# Patient Record
Sex: FEMALE | Race: WHITE | NOT HISPANIC OR LATINO | Employment: OTHER | URBAN - NONMETROPOLITAN AREA
[De-identification: names, ages, dates, MRNs, and addresses within clinical notes are randomized per-mention and may not be internally consistent; named-entity substitution may affect disease eponyms.]

---

## 2023-06-19 ENCOUNTER — OFFICE VISIT (OUTPATIENT)
Dept: URGENT CARE | Facility: PHYSICIAN GROUP | Age: 63
End: 2023-06-19
Payer: OTHER GOVERNMENT

## 2023-06-19 VITALS
DIASTOLIC BLOOD PRESSURE: 88 MMHG | WEIGHT: 293 LBS | HEART RATE: 103 BPM | RESPIRATION RATE: 16 BRPM | TEMPERATURE: 97.2 F | SYSTOLIC BLOOD PRESSURE: 140 MMHG | OXYGEN SATURATION: 94 % | BODY MASS INDEX: 50.02 KG/M2 | HEIGHT: 64 IN

## 2023-06-19 DIAGNOSIS — H66.92 ACUTE BACTERIAL OTITIS MEDIA, LEFT: ICD-10-CM

## 2023-06-19 PROCEDURE — 3079F DIAST BP 80-89 MM HG: CPT | Performed by: NURSE PRACTITIONER

## 2023-06-19 PROCEDURE — 99203 OFFICE O/P NEW LOW 30 MIN: CPT | Performed by: NURSE PRACTITIONER

## 2023-06-19 PROCEDURE — 3077F SYST BP >= 140 MM HG: CPT | Performed by: NURSE PRACTITIONER

## 2023-06-19 RX ORDER — DOXYCYCLINE HYCLATE 100 MG
100 TABLET ORAL 2 TIMES DAILY
Qty: 20 TABLET | Refills: 0 | Status: SHIPPED | OUTPATIENT
Start: 2023-06-19 | End: 2023-06-29

## 2023-06-19 ASSESSMENT — ENCOUNTER SYMPTOMS
DIZZINESS: 0
MYALGIAS: 0
FEVER: 0
HEADACHES: 0
SORE THROAT: 0
SINUS PAIN: 0
CHILLS: 0

## 2023-06-20 NOTE — PROGRESS NOTES
"Subjective:   Mariluz Vincent is a 62 y.o. female who presents for Otalgia (L ear pain, was seen 5/2 and 5/12 for same thing, received antibiotics both times,( doxycycline) )    Very pleasant 62-year-old female patient presents to clinic today with ongoing left ear pain and gland swelling and pain under jaw for over a month.  She has been treated twice at [facility] for otitis media of left ear once with Augmentin and the second round of antibiotics was doxycycline.  Patient does state that she did feel some symptom relief for several days with both antibiotics however symptoms did return within a few days.  She has not had any fever, chills, hearing loss, discharge from her ears, nasal congestion or sore throat.  She has been taking over-the-counter acetaminophen to help with the pain.      Review of Systems   Constitutional:  Positive for malaise/fatigue. Negative for chills and fever.   HENT:  Positive for ear pain. Negative for congestion, ear discharge, hearing loss, sinus pain, sore throat and tinnitus.    Musculoskeletal:  Negative for myalgias.   Neurological:  Negative for dizziness and headaches.       Medications, Allergies, and current problem list reviewed today in Epic.     Objective:     BP (!) 140/88   Pulse (!) 103   Temp 36.2 °C (97.2 °F) (Temporal)   Resp 16   Ht 1.626 m (5' 4\")   Wt (!) 136 kg (300 lb)   SpO2 94%     Physical Exam  Vitals reviewed.   Constitutional:       Appearance: Normal appearance.   HENT:      Head: Normocephalic.      Right Ear: Tympanic membrane, ear canal and external ear normal. No middle ear effusion. No mastoid tenderness. Tympanic membrane is not erythematous.      Left Ear: Tympanic membrane, ear canal and external ear normal.  No middle ear effusion. No mastoid tenderness. Tympanic membrane is not erythematous.      Nose: Nose normal. No mucosal edema, congestion or rhinorrhea.      Right Sinus: No maxillary sinus tenderness or frontal sinus " tenderness.      Left Sinus: No maxillary sinus tenderness or frontal sinus tenderness.      Mouth/Throat:      Lips: Pink.      Mouth: Mucous membranes are moist.   Eyes:      Extraocular Movements: Extraocular movements intact.      Conjunctiva/sclera: Conjunctivae normal.      Pupils: Pupils are equal, round, and reactive to light.   Cardiovascular:      Rate and Rhythm: Normal rate.   Pulmonary:      Effort: Pulmonary effort is normal.   Musculoskeletal:         General: Normal range of motion.      Cervical back: Normal range of motion and neck supple.   Lymphadenopathy:      Head:      Left side of head: Submandibular adenopathy present.   Skin:     General: Skin is warm and dry.   Neurological:      Mental Status: She is alert and oriented to person, place, and time.   Psychiatric:         Mood and Affect: Mood normal.         Behavior: Behavior normal.         Thought Content: Thought content normal.         Judgment: Judgment normal.         Assessment/Plan:     Diagnosis and associated orders:     1. Acute bacterial otitis media, left  doxycycline (VIBRAMYCIN) 100 MG Tab         Comments/MDM:     Patient presents to clinic with reoccurring left otitis media and submandibular lymph node swelling.  Discussed with patient at length that if symptoms continue or worsen she really needs to follow-up in the emergency department as she may possibly need CT scan for possible abscess.  Patient was agreeable.  We will go ahead and try 1 more round of doxycycline as she has tolerated this well in the past.  Patient's vital signs today do show mild tachycardia however patient states that this is chronic in nature and due to anxiety in clinic.  She states that her normal heart rate at home is between 80 and 90 resting.  She does not have any palpitations.  She also has mildly elevated blood pressure today at 140/80, patient also states secondary to anxiety being in clinic.Patient is asymptomatic.  She is afebrile.  Recommended following up with primary care provider if tachycardia continues.       Differential diagnosis, natural history, supportive care, and indications for immediate follow-up discussed.    Advised the patient to follow-up with the primary care physician for recheck, reevaluation, and consideration of further management.    Please note that this dictation was created using voice recognition software. I have made a reasonable attempt to correct obvious errors, but I expect that there are errors of grammar and possibly content that I did not discover before finalizing the note.